# Patient Record
Sex: FEMALE | Race: WHITE | NOT HISPANIC OR LATINO | Employment: FULL TIME | URBAN - METROPOLITAN AREA
[De-identification: names, ages, dates, MRNs, and addresses within clinical notes are randomized per-mention and may not be internally consistent; named-entity substitution may affect disease eponyms.]

---

## 2017-11-27 ENCOUNTER — TELEPHONE (OUTPATIENT)
Dept: FAMILY MEDICINE CLINIC | Facility: CLINIC | Age: 52
End: 2017-11-27

## 2017-11-27 NOTE — TELEPHONE ENCOUNTER
PATIENT CALLED C/O CHEST PRESSURE/PAIN OFF AND ON, SOME NAUSEA, AND SHE FELT LIKE SHE WAS GOING TO PASS OUT THE OTHER DAY. I ADVISED PATIENT TO GO TO THE ER.

## 2017-11-28 ENCOUNTER — HOSPITAL ENCOUNTER (EMERGENCY)
Facility: HOSPITAL | Age: 52
Discharge: LEFT BEFORE DISCHARGE | End: 2017-11-28

## 2017-11-28 ENCOUNTER — APPOINTMENT (OUTPATIENT)
Dept: GENERAL RADIOLOGY | Facility: HOSPITAL | Age: 52
End: 2017-11-28

## 2017-11-28 VITALS
SYSTOLIC BLOOD PRESSURE: 120 MMHG | OXYGEN SATURATION: 99 % | WEIGHT: 140 LBS | DIASTOLIC BLOOD PRESSURE: 83 MMHG | HEIGHT: 68 IN | RESPIRATION RATE: 16 BRPM | TEMPERATURE: 98.2 F | HEART RATE: 73 BPM | BODY MASS INDEX: 21.22 KG/M2

## 2017-11-28 LAB
ALBUMIN SERPL-MCNC: 4.5 G/DL (ref 3.5–5.2)
ALBUMIN/GLOB SERPL: 1.8 G/DL
ALP SERPL-CCNC: 45 U/L (ref 39–117)
ALT SERPL W P-5'-P-CCNC: 12 U/L (ref 1–33)
ANION GAP SERPL CALCULATED.3IONS-SCNC: 6.2 MMOL/L
AST SERPL-CCNC: 24 U/L (ref 1–32)
BASOPHILS # BLD AUTO: 0.02 10*3/MM3 (ref 0–0.2)
BASOPHILS NFR BLD AUTO: 0.3 % (ref 0–1.5)
BILIRUB SERPL-MCNC: 0.8 MG/DL (ref 0.1–1.2)
BUN BLD-MCNC: 11 MG/DL (ref 6–20)
BUN/CREAT SERPL: 14.3 (ref 7–25)
CALCIUM SPEC-SCNC: 9.5 MG/DL (ref 8.6–10.5)
CHLORIDE SERPL-SCNC: 102 MMOL/L (ref 98–107)
CO2 SERPL-SCNC: 30.8 MMOL/L (ref 22–29)
CREAT BLD-MCNC: 0.77 MG/DL (ref 0.57–1)
DEPRECATED RDW RBC AUTO: 47.4 FL (ref 37–54)
EOSINOPHIL # BLD AUTO: 0.17 10*3/MM3 (ref 0–0.7)
EOSINOPHIL NFR BLD AUTO: 2.7 % (ref 0.3–6.2)
ERYTHROCYTE [DISTWIDTH] IN BLOOD BY AUTOMATED COUNT: 16.5 % (ref 11.7–13)
GFR SERPL CREATININE-BSD FRML MDRD: 79 ML/MIN/1.73
GLOBULIN UR ELPH-MCNC: 2.5 GM/DL
GLUCOSE BLD-MCNC: 102 MG/DL (ref 65–99)
HCG SERPL QL: NEGATIVE
HCT VFR BLD AUTO: 33.1 % (ref 35.6–45.5)
HGB BLD-MCNC: 9.8 G/DL (ref 11.9–15.5)
HOLD SPECIMEN: NORMAL
IMM GRANULOCYTES # BLD: 0 10*3/MM3 (ref 0–0.03)
IMM GRANULOCYTES NFR BLD: 0 % (ref 0–0.5)
LYMPHOCYTES # BLD AUTO: 1.65 10*3/MM3 (ref 0.9–4.8)
LYMPHOCYTES NFR BLD AUTO: 26.4 % (ref 19.6–45.3)
MCH RBC QN AUTO: 23.4 PG (ref 26.9–32)
MCHC RBC AUTO-ENTMCNC: 29.6 G/DL (ref 32.4–36.3)
MCV RBC AUTO: 79 FL (ref 80.5–98.2)
MONOCYTES # BLD AUTO: 0.43 10*3/MM3 (ref 0.2–1.2)
MONOCYTES NFR BLD AUTO: 6.9 % (ref 5–12)
NEUTROPHILS # BLD AUTO: 3.98 10*3/MM3 (ref 1.9–8.1)
NEUTROPHILS NFR BLD AUTO: 63.7 % (ref 42.7–76)
PLATELET # BLD AUTO: 323 10*3/MM3 (ref 140–500)
PMV BLD AUTO: 8.8 FL (ref 6–12)
POTASSIUM BLD-SCNC: 3.9 MMOL/L (ref 3.5–5.2)
PROT SERPL-MCNC: 7 G/DL (ref 6–8.5)
RBC # BLD AUTO: 4.19 10*6/MM3 (ref 3.9–5.2)
SODIUM BLD-SCNC: 139 MMOL/L (ref 136–145)
TROPONIN T SERPL-MCNC: <0.01 NG/ML (ref 0–0.03)
WBC NRBC COR # BLD: 6.25 10*3/MM3 (ref 4.5–10.7)
WHOLE BLOOD HOLD SPECIMEN: NORMAL
WHOLE BLOOD HOLD SPECIMEN: NORMAL

## 2017-11-28 PROCEDURE — 80053 COMPREHEN METABOLIC PANEL: CPT

## 2017-11-28 PROCEDURE — 99211 OFF/OP EST MAY X REQ PHY/QHP: CPT

## 2017-11-28 PROCEDURE — 85025 COMPLETE CBC W/AUTO DIFF WBC: CPT

## 2017-11-28 PROCEDURE — 84484 ASSAY OF TROPONIN QUANT: CPT

## 2017-11-28 PROCEDURE — 93005 ELECTROCARDIOGRAM TRACING: CPT

## 2017-11-28 PROCEDURE — 84703 CHORIONIC GONADOTROPIN ASSAY: CPT

## 2017-11-28 PROCEDURE — 93010 ELECTROCARDIOGRAM REPORT: CPT | Performed by: INTERNAL MEDICINE

## 2017-11-28 PROCEDURE — 36415 COLL VENOUS BLD VENIPUNCTURE: CPT

## 2017-11-28 PROCEDURE — 71020 HC CHEST PA AND LATERAL: CPT

## 2017-11-28 RX ORDER — SODIUM CHLORIDE 0.9 % (FLUSH) 0.9 %
10 SYRINGE (ML) INJECTION AS NEEDED
Status: DISCONTINUED | OUTPATIENT
Start: 2017-11-28 | End: 2017-11-28 | Stop reason: HOSPADM

## 2017-11-28 RX ORDER — ASPIRIN 325 MG
325 TABLET ORAL ONCE
Status: DISCONTINUED | OUTPATIENT
Start: 2017-11-28 | End: 2017-11-28 | Stop reason: HOSPADM

## 2018-05-18 ENCOUNTER — APPOINTMENT (OUTPATIENT)
Dept: CT IMAGING | Facility: HOSPITAL | Age: 53
End: 2018-05-18

## 2018-05-18 ENCOUNTER — APPOINTMENT (OUTPATIENT)
Dept: GENERAL RADIOLOGY | Facility: HOSPITAL | Age: 53
End: 2018-05-18

## 2018-05-18 ENCOUNTER — HOSPITAL ENCOUNTER (EMERGENCY)
Facility: HOSPITAL | Age: 53
Discharge: HOME | End: 2018-05-18
Attending: EMERGENCY MEDICINE | Admitting: EMERGENCY MEDICINE

## 2018-05-18 VITALS
HEART RATE: 58 BPM | OXYGEN SATURATION: 99 % | DIASTOLIC BLOOD PRESSURE: 70 MMHG | HEIGHT: 68 IN | SYSTOLIC BLOOD PRESSURE: 101 MMHG | BODY MASS INDEX: 21.07 KG/M2 | RESPIRATION RATE: 20 BRPM | TEMPERATURE: 98.1 F | WEIGHT: 139 LBS

## 2018-05-18 DIAGNOSIS — M62.838 NECK MUSCLE SPASM: ICD-10-CM

## 2018-05-18 DIAGNOSIS — G44.209 TENSION HEADACHE: Primary | ICD-10-CM

## 2018-05-18 LAB
ALBUMIN SERPL-MCNC: 4.6 G/DL (ref 3.5–5.2)
ALBUMIN/GLOB SERPL: 1.8 G/DL
ALP SERPL-CCNC: 46 U/L (ref 39–117)
ALT SERPL W P-5'-P-CCNC: 9 U/L (ref 1–33)
ANION GAP SERPL CALCULATED.3IONS-SCNC: 9.1 MMOL/L
AST SERPL-CCNC: 19 U/L (ref 1–32)
BASOPHILS # BLD AUTO: 0.01 10*3/MM3 (ref 0–0.2)
BASOPHILS NFR BLD AUTO: 0.2 % (ref 0–1.5)
BILIRUB SERPL-MCNC: 1 MG/DL (ref 0.1–1.2)
BUN BLD-MCNC: 13 MG/DL (ref 6–20)
BUN/CREAT SERPL: 18.6 (ref 7–25)
CALCIUM SPEC-SCNC: 9.5 MG/DL (ref 8.6–10.5)
CHLORIDE SERPL-SCNC: 104 MMOL/L (ref 98–107)
CO2 SERPL-SCNC: 28.9 MMOL/L (ref 22–29)
CREAT BLD-MCNC: 0.7 MG/DL (ref 0.57–1)
DEPRECATED RDW RBC AUTO: 54 FL (ref 37–54)
EOSINOPHIL # BLD AUTO: 0.18 10*3/MM3 (ref 0–0.7)
EOSINOPHIL NFR BLD AUTO: 4.1 % (ref 0.3–6.2)
ERYTHROCYTE [DISTWIDTH] IN BLOOD BY AUTOMATED COUNT: 17.5 % (ref 11.7–13)
GFR SERPL CREATININE-BSD FRML MDRD: 88 ML/MIN/1.73
GLOBULIN UR ELPH-MCNC: 2.5 GM/DL
GLUCOSE BLD-MCNC: 84 MG/DL (ref 65–99)
HCG SERPL QL: NORMAL
HCT VFR BLD AUTO: 35.2 % (ref 35.6–45.5)
HGB BLD-MCNC: 11.2 G/DL (ref 11.9–15.5)
HOLD SPECIMEN: NORMAL
IMM GRANULOCYTES # BLD: 0.01 10*3/MM3 (ref 0–0.03)
IMM GRANULOCYTES NFR BLD: 0.2 % (ref 0–0.5)
LYMPHOCYTES # BLD AUTO: 1.71 10*3/MM3 (ref 0.9–4.8)
LYMPHOCYTES NFR BLD AUTO: 38.6 % (ref 19.6–45.3)
MAGNESIUM SERPL-MCNC: 2.2 MG/DL (ref 1.6–2.6)
MCH RBC QN AUTO: 26.7 PG (ref 26.9–32)
MCHC RBC AUTO-ENTMCNC: 31.8 G/DL (ref 32.4–36.3)
MCV RBC AUTO: 83.8 FL (ref 80.5–98.2)
MONOCYTES # BLD AUTO: 0.38 10*3/MM3 (ref 0.2–1.2)
MONOCYTES NFR BLD AUTO: 8.6 % (ref 5–12)
NEUTROPHILS # BLD AUTO: 2.15 10*3/MM3 (ref 1.9–8.1)
NEUTROPHILS NFR BLD AUTO: 48.5 % (ref 42.7–76)
PLATELET # BLD AUTO: 294 10*3/MM3 (ref 140–500)
PMV BLD AUTO: 8.9 FL (ref 6–12)
POTASSIUM BLD-SCNC: 4 MMOL/L (ref 3.5–5.2)
PROT SERPL-MCNC: 7.1 G/DL (ref 6–8.5)
RBC # BLD AUTO: 4.2 10*6/MM3 (ref 3.9–5.2)
SODIUM BLD-SCNC: 142 MMOL/L (ref 136–145)
TROPONIN T SERPL-MCNC: <0.01 NG/ML (ref 0–0.03)
WBC NRBC COR # BLD: 4.43 10*3/MM3 (ref 4.5–10.7)
WHOLE BLOOD HOLD SPECIMEN: NORMAL
WHOLE BLOOD HOLD SPECIMEN: NORMAL

## 2018-05-18 PROCEDURE — 84703 CHORIONIC GONADOTROPIN ASSAY: CPT

## 2018-05-18 PROCEDURE — 72050 X-RAY EXAM NECK SPINE 4/5VWS: CPT

## 2018-05-18 PROCEDURE — 85025 COMPLETE CBC W/AUTO DIFF WBC: CPT

## 2018-05-18 PROCEDURE — 80053 COMPREHEN METABOLIC PANEL: CPT

## 2018-05-18 PROCEDURE — 99284 EMERGENCY DEPT VISIT MOD MDM: CPT

## 2018-05-18 PROCEDURE — 83735 ASSAY OF MAGNESIUM: CPT

## 2018-05-18 PROCEDURE — 70450 CT HEAD/BRAIN W/O DYE: CPT

## 2018-05-18 PROCEDURE — 84484 ASSAY OF TROPONIN QUANT: CPT

## 2018-05-18 RX ORDER — DIAZEPAM 5 MG/1
5 TABLET ORAL ONCE
Status: COMPLETED | OUTPATIENT
Start: 2018-05-18 | End: 2018-05-18

## 2018-05-18 RX ORDER — HYDROCODONE BITARTRATE AND ACETAMINOPHEN 7.5; 325 MG/1; MG/1
1 TABLET ORAL ONCE
Status: COMPLETED | OUTPATIENT
Start: 2018-05-18 | End: 2018-05-18

## 2018-05-18 RX ORDER — SODIUM CHLORIDE 0.9 % (FLUSH) 0.9 %
10 SYRINGE (ML) INJECTION AS NEEDED
Status: DISCONTINUED | OUTPATIENT
Start: 2018-05-18 | End: 2018-05-18 | Stop reason: HOSPADM

## 2018-05-18 RX ORDER — BACLOFEN 10 MG/1
10 TABLET ORAL 3 TIMES DAILY PRN
Qty: 15 TABLET | Refills: 0 | Status: SHIPPED | OUTPATIENT
Start: 2018-05-18 | End: 2019-05-21

## 2018-05-18 RX ADMIN — DIAZEPAM 5 MG: 5 TABLET ORAL at 17:31

## 2018-05-18 RX ADMIN — HYDROCODONE BITARTRATE AND ACETAMINOPHEN 1 TABLET: 7.5; 325 TABLET ORAL at 17:30

## 2018-05-18 NOTE — ED PROVIDER NOTES
The patient presents complaining of HA and neck pain that began 2 days ago. The pt states the pain started between her shoulder blades and is now in her neck and head. The pt states the pain is worse with movement of the neck. Pt reports a hx of tension HA and increased stress in her life.    Physical Exam:  Patient is nontoxic appearing and in NAD. The pt is alert and oriented X 3. The pt has muscle spasm to the cervical and trapezius muscles. The pain is worse with movement of the head to the right and left. The pt has no meningeal signs.    Discussed the negative lab and CT results with the pt. Discussed the possible causes of the pt's pain including the pt having muscle spasm and tension HA. Discussed the plan to discharge the pt with Flexeril and Ibuprofen. I advised the pt to take the Flexeril at night to help with the pain and use Ibuprofen during the day while at work to prevent drowsiness at work.     MD ATTESTATION NOTE    The STEFF and I have discussed this patient's history, physical exam, and treatment plan.  I have reviewed the documentation and personally had a face to face interaction with the patient. I affirm the documentation and agree with the treatment and plan.  The attached note describes my personal findings.    Documentation assistance provided by tayler Covington for Dr. Ledesma. Information recorded by the abilioe was done at my direction and has been verified and validated by me.                 Bry Covington  05/18/18 6069       Alden Ledesma MD  05/18/18 4670

## 2018-05-18 NOTE — DISCHARGE INSTRUCTIONS
Medications as ordered  Ibuprofen as needed for headache  Warm compresses to neck with gentle stretching  Follow up with pmd in 5-7 days if symptoms not improving  Return to er for numbness/tingling to hands, worsening headache, increased pain or any new or worsening symptoms

## 2018-05-18 NOTE — ED PROVIDER NOTES
"  EMERGENCY DEPARTMENT ENCOUNTER    CHIEF COMPLAINT  Chief Complaint: neck pain  History given by: patient  History limited by: N/A  Room Number: 39/39  PMD: Andrew Lazo MD      HPI:  Pt is a 53 y.o. female who presents with neck pain. She states that about 2 days ago, she had a migraine (characteristically similar to her previous migraines) that resolved on its own. Yesterday, she had an episode of dizziness (described as \"room spinning\") that also resolved on its own. This morning, while she was getting out of the shower, she had onset of \"cool and burning\" pain from her shoulders to her neck and up to the vertex of her head (pain worst to the base of her neck). Pt states that today's pain is constant, rated at 8/10 in intensity, and is not the worst headache of her life. It is exacerbated by head/neck movement. She denies recent injury or trauma to head or neck, dizziness today, focal weakness, numbness, trouble with speech, trouble swallowing, vision changes, nausea, vomiting, neck stiffness, chest pain, dyspnea, abd pain, fevers, and chills. Past Medical History of migraines.     Duration: started this morning  Timing: constant  Location: from shoulders to neck and up to vertex of head (pain worst to the base of neck)  Radiation: from shoulders to neck and up to vertex of head  Quality: \"cool and burning\"  Intensity/Severity: rated at 8/10  Progression: unchanged  Associated Symptoms: headache (characteristically similar to previous migraines, occurred about 2 days ago, resolved), dizziness (occurred yesterday, resolved)  Aggravating Factors: head/neck movement  Alleviating Factors: keeping head/neck still  Previous Episodes: none  Treatment before arrival: none mentioned    PAST MEDICAL HISTORY  Active Ambulatory Problems     Diagnosis Date Noted   • No Active Ambulatory Problems     Resolved Ambulatory Problems     Diagnosis Date Noted   • No Resolved Ambulatory Problems     Past Medical History: " "  Diagnosis Date   • Migraine        PAST SURGICAL HISTORY  Past Surgical History:   Procedure Laterality Date   •  SECTION         FAMILY HISTORY  Family History   Problem Relation Age of Onset   • No Known Problems Mother    • No Known Problems Father        SOCIAL HISTORY  Social History     Social History   • Marital status:      Spouse name: N/A   • Number of children: N/A   • Years of education: N/A     Occupational History   • Not on file.     Social History Main Topics   • Smoking status: Never Smoker   • Smokeless tobacco: Not on file   • Alcohol use Yes      Comment: OCCASIONAL   • Drug use: No   • Sexual activity: Not on file     Other Topics Concern   • Not on file     Social History Narrative   • No narrative on file         ALLERGIES  Patient has no known allergies.    REVIEW OF SYSTEMS  Review of Systems   Constitutional: Negative for chills and fever.   HENT: Negative for sore throat and trouble swallowing.    Eyes: Negative for visual disturbance.   Respiratory: Negative for shortness of breath.    Cardiovascular: Negative for chest pain.   Gastrointestinal: Negative for abdominal pain, nausea and vomiting.   Genitourinary: Negative for difficulty urinating and dysuria.   Musculoskeletal: Positive for neck pain (pain from shoulders to neck and up to vertex of head (pain worst to the base of neck)). Negative for back pain and neck stiffness.   Skin: Negative for rash.   Neurological: Positive for dizziness (described as \"room spinning\" (occurred yesterday, resolved)) and headaches (pain from shoulders to neck and up to vertex of head (occurred today, pain worst to base of neck); headache (characteristically similar to previous migraines, occurred about 2 days ago, resolved)). Negative for speech difficulty, weakness and numbness.   Psychiatric/Behavioral: The patient is not nervous/anxious.        PHYSICAL EXAM  ED Triage Vitals   Temp Heart Rate Resp BP SpO2   18 1302 18 " 1302 05/18/18 1302 05/18/18 1409 05/18/18 1302   97.7 °F (36.5 °C) 80 16 125/68 99 % WNL       Physical Exam   Constitutional: She is oriented to person, place, and time and well-developed, well-nourished, and in no distress.   HENT:   Head: Normocephalic and atraumatic.   Mouth/Throat: Mucous membranes are normal.   Eyes: EOM are normal. Pupils are equal, round, and reactive to light. No scleral icterus.   Neck: Neck supple.   Bilateral paraspinal cervical tenderness, no midline c-spine tenderness, painful ROM of neck, no nuchal rigidity, no meningeal signs   Cardiovascular: Normal rate, regular rhythm and normal heart sounds.    Pulses:       Radial pulses are 2+ on the right side, and 2+ on the left side.   Pulmonary/Chest: Effort normal and breath sounds normal. No respiratory distress.   Musculoskeletal:   No t-spine tenderness, NV intact distally to BUE   Neurological: She is alert and oriented to person, place, and time. She has normal motor skills and normal sensation. GCS score is 15.   Equal  strength bilaterally, nonfocal neuro exam   Skin: Skin is warm and dry.   Psychiatric: Mood and affect normal.   Nursing note and vitals reviewed.      LAB RESULTS  Recent Results (from the past 24 hour(s))   Comprehensive Metabolic Panel    Collection Time: 05/18/18  2:32 PM   Result Value Ref Range    Glucose 84 65 - 99 mg/dL    BUN 13 6 - 20 mg/dL    Creatinine 0.70 0.57 - 1.00 mg/dL    Sodium 142 136 - 145 mmol/L    Potassium 4.0 3.5 - 5.2 mmol/L    Chloride 104 98 - 107 mmol/L    CO2 28.9 22.0 - 29.0 mmol/L    Calcium 9.5 8.6 - 10.5 mg/dL    Total Protein 7.1 6.0 - 8.5 g/dL    Albumin 4.60 3.50 - 5.20 g/dL    ALT (SGPT) 9 1 - 33 U/L    AST (SGOT) 19 1 - 32 U/L    Alkaline Phosphatase 46 39 - 117 U/L    Total Bilirubin 1.0 0.1 - 1.2 mg/dL    eGFR Non African Amer 88 >60 mL/min/1.73    Globulin 2.5 gm/dL    A/G Ratio 1.8 g/dL    BUN/Creatinine Ratio 18.6 7.0 - 25.0    Anion Gap 9.1 mmol/L   Magnesium     Collection Time: 05/18/18  2:32 PM   Result Value Ref Range    Magnesium 2.2 1.6 - 2.6 mg/dL   Troponin    Collection Time: 05/18/18  2:32 PM   Result Value Ref Range    Troponin T <0.010 0.000 - 0.030 ng/mL   hCG, Serum, Qualitative    Collection Time: 05/18/18  2:32 PM   Result Value Ref Range    HCG Qualitative Indeterminate Indeterminate, Negative   Light Blue Top    Collection Time: 05/18/18  2:32 PM   Result Value Ref Range    Extra Tube hold for add-on    Green Top (Gel)    Collection Time: 05/18/18  2:32 PM   Result Value Ref Range    Extra Tube Hold for add-ons.    Lavender Top    Collection Time: 05/18/18  2:32 PM   Result Value Ref Range    Extra Tube hold for add-on    CBC Auto Differential    Collection Time: 05/18/18  2:32 PM   Result Value Ref Range    WBC 4.43 (L) 4.50 - 10.70 10*3/mm3    RBC 4.20 3.90 - 5.20 10*6/mm3    Hemoglobin 11.2 (L) 11.9 - 15.5 g/dL    Hematocrit 35.2 (L) 35.6 - 45.5 %    MCV 83.8 80.5 - 98.2 fL    MCH 26.7 (L) 26.9 - 32.0 pg    MCHC 31.8 (L) 32.4 - 36.3 g/dL    RDW 17.5 (H) 11.7 - 13.0 %    RDW-SD 54.0 37.0 - 54.0 fl    MPV 8.9 6.0 - 12.0 fL    Platelets 294 140 - 500 10*3/mm3    Neutrophil % 48.5 42.7 - 76.0 %    Lymphocyte % 38.6 19.6 - 45.3 %    Monocyte % 8.6 5.0 - 12.0 %    Eosinophil % 4.1 0.3 - 6.2 %    Basophil % 0.2 0.0 - 1.5 %    Immature Grans % 0.2 0.0 - 0.5 %    Neutrophils, Absolute 2.15 1.90 - 8.10 10*3/mm3    Lymphocytes, Absolute 1.71 0.90 - 4.80 10*3/mm3    Monocytes, Absolute 0.38 0.20 - 1.20 10*3/mm3    Eosinophils, Absolute 0.18 0.00 - 0.70 10*3/mm3    Basophils, Absolute 0.01 0.00 - 0.20 10*3/mm3    Immature Grans, Absolute 0.01 0.00 - 0.03 10*3/mm3       I ordered the above labs and reviewed the results      RADIOLOGY  XR Spine Cervical Complete 4 or 5 View (Preliminary result)   Result time 05/18/18 17:31:08   Preliminary result by Iván Riggins MD (05/18/18 17:31:08)                Impression:    Degenerative disc and facet change in the cervical  spine  without any subluxation or appreciable osseous foraminal stenosis. There  is no acute appearing abnormality.               Narrative:    CERVICAL SPINE X-RAYS     HISTORY: Neck pain started this morning and radiates to the shoulders.     TECHNIQUE: 5 x-rays of the cervical spine are provided.     FINDINGS: There is moderate disc space narrowing at C5-6 with mild disc  space narrowing at C6-7 and C7-T1, facet arthropathy is observed at the  mid-to-lower cervical spine, most advanced at C7-T1. Vertebral body  height and alignment are normal. There is no osseous foraminal stenosis.  No bone lesion is present. Visualized lung apices are clear.                       CT Head Without Contrast (Final result)   Result time 05/18/18 17:29:43   Final result by Simón Hernández MD (05/18/18 17:29:43)                Narrative:    CT SCAN OF THE BRAIN WITHOUT CONTRAST     HISTORY: Severe headaches. Dizziness.     TECHNIQUE: The CT scan was performed as an emergency procedure through  the brain without contrast.      FINDINGS: The ventricles are normal in size and midline. There is no  evidence of intracranial hemorrhage or focal lesion or mass effect and  the visualized sinuses are clear.             Radiation dose reduction techniques were utilized, including automated  exposure control and exposure modulation based on body size.     This report was finalized on 5/18/2018 5:29 PM by Dr. Doni Hernández M.D.                       I ordered the above noted radiological studies and reviewed the images on the PACS system.         PROGRESS AND CONSULTS  1626- Ordered CT head and c-spine xray for further evaluation.  1655- Obtained CT head results-> no acute process.  1700- Reviewed pt's history and workup with Dr. Ledesma.  At bedside evaluation, they agree with the plan of care.  1706- Ordered norco and valium for pain.   1750- Rechecked pt. She is resting comfortably and is in no acute distress. Her sx have improved after  ED treatment. Reviewed implications of results (including stable labs, CT head findings (no acute process), c-spine xray findings (degenerative changes of c-spine, no acute fracture)), diagnosis, meds, responsibility to follow up, warning signs and symptoms of possible worsening, potential complications and reasons to return to ER with patient.  Discussed all results and noted any abnormalities with patient.  Discussed absolute need to recheck abnormalities and condition with PMD. Informed pt that her sx could possibly be due to tension headache and cervical muscle spasm. Informed pt of plan to prescribe muscle relaxer. Advised pt to take ibuprofen for headache and to apply warm compresses to neck with gentle stretching.   Discussed plan for discharge, as there is no emergent indication for admission.  Pt is agreeable and understands need for follow up and repeat testing.  Pt is aware that discharge does not mean that nothing is wrong but it indicates no emergency is present.  Pt is discharged with instructions to follow up with primary care doctor to have their blood pressure rechecked.       DIAGNOSIS  Final diagnoses:   Tension headache   Neck muscle spasm       FOLLOW UP   Andrew Lazo MD  [id number] [place] [address]  [phone]    Schedule an appointment as soon as possible for a visit   As needed      RX     Medication List     baclofen 10 MG tablet  Commonly known as:  LIORESAL  Take 1 tablet by mouth 3 (Three) Times a Day As Needed for Muscle Spasms.        COURSE & MEDICAL DECISION MAKING  Pertinent Labs and Imaging studies that were ordered and reviewed are noted above.  Results were reviewed/discussed with the patient and they were also made aware of online assess.   Pt also made aware that some labs, such as cultures, will not be resulted during ER visit and follow up with PMD is necessary.     MEDICATIONS GIVEN IN ER  Medications   sodium chloride 0.9 % flush 10 mL (not administered)  "  HYDROcodone-acetaminophen (NORCO) 7.5-325 MG per tablet 1 tablet (1 tablet Oral Given 5/18/18 1730)   diazePAM (VALIUM) tablet 5 mg (5 mg Oral Given 5/18/18 1731)       /70   Pulse 58   Temp 98.1 °F (36.7 °C) (Oral)   Resp 20   Ht 172.7 cm (68\")   Wt 63 kg (139 lb)   LMP 05/16/2018   SpO2 99%   BMI 21.13 kg/m²       I personally reviewed the past medical history, past surgical history, social history, family history, current medications and allergies as they appear in this chart.  The scribe's note accurately reflects the work and decisions made by me.     Documentation assistance provided by tayler Latham for JOCELYN Alatorre on 5/18/2018 at 6:40 PM. Information recorded by the scribe was done at my direction and has been verified and validated by me.       Korey Latham  05/18/18 1923       JOSE LUIS Hearn  05/18/18 2015    "

## 2018-05-21 ENCOUNTER — TELEPHONE (OUTPATIENT)
Dept: SOCIAL WORK | Facility: HOSPITAL | Age: 53
End: 2018-05-21

## 2019-05-21 ENCOUNTER — OFFICE VISIT (OUTPATIENT)
Dept: FAMILY MEDICINE CLINIC | Facility: CLINIC | Age: 54
End: 2019-05-21

## 2019-05-21 VITALS
BODY MASS INDEX: 22.91 KG/M2 | DIASTOLIC BLOOD PRESSURE: 72 MMHG | WEIGHT: 151.2 LBS | HEIGHT: 68 IN | SYSTOLIC BLOOD PRESSURE: 116 MMHG | OXYGEN SATURATION: 98 % | HEART RATE: 65 BPM | TEMPERATURE: 98.1 F

## 2019-05-21 DIAGNOSIS — N30.00 ACUTE CYSTITIS WITHOUT HEMATURIA: Primary | ICD-10-CM

## 2019-05-21 LAB
BILIRUB BLD-MCNC: NEGATIVE MG/DL
CLARITY, POC: CLEAR
COLOR UR: YELLOW
GLUCOSE UR STRIP-MCNC: NEGATIVE MG/DL
KETONES UR QL: NEGATIVE
LEUKOCYTE EST, POC: NEGATIVE
NITRITE UR-MCNC: NEGATIVE MG/ML
PH UR: 5.5 [PH] (ref 5–8)
PROT UR STRIP-MCNC: NEGATIVE MG/DL
RBC # UR STRIP: ABNORMAL /UL
SP GR UR: 1.02 (ref 1–1.03)
UROBILINOGEN UR QL: NORMAL

## 2019-05-21 PROCEDURE — 99213 OFFICE O/P EST LOW 20 MIN: CPT | Performed by: NURSE PRACTITIONER

## 2019-05-21 PROCEDURE — 81003 URINALYSIS AUTO W/O SCOPE: CPT | Performed by: NURSE PRACTITIONER

## 2019-05-21 RX ORDER — NITROFURANTOIN 25; 75 MG/1; MG/1
100 CAPSULE ORAL 2 TIMES DAILY
Qty: 14 CAPSULE | Refills: 0 | Status: SHIPPED | OUTPATIENT
Start: 2019-05-21 | End: 2020-07-24

## 2019-05-23 LAB
BACTERIA UR CULT: NORMAL
BACTERIA UR CULT: NORMAL

## 2021-04-05 ENCOUNTER — IMMUNIZATION (OUTPATIENT)
Dept: VACCINE CLINIC | Facility: HOSPITAL | Age: 56
End: 2021-04-05

## 2021-04-05 PROCEDURE — 0001A: CPT | Performed by: INTERNAL MEDICINE

## 2021-04-05 PROCEDURE — 91300 HC SARSCOV02 VAC 30MCG/0.3ML IM: CPT | Performed by: INTERNAL MEDICINE

## 2021-04-26 ENCOUNTER — IMMUNIZATION (OUTPATIENT)
Dept: VACCINE CLINIC | Facility: HOSPITAL | Age: 56
End: 2021-04-26

## 2021-04-26 PROCEDURE — 91300 HC SARSCOV02 VAC 30MCG/0.3ML IM: CPT | Performed by: INTERNAL MEDICINE

## 2021-04-26 PROCEDURE — 0002A: CPT | Performed by: INTERNAL MEDICINE

## 2023-07-17 NOTE — PROGRESS NOTES
Patient inpatient.   Subjective   Misti Mcguire is a 54 y.o. female.   Chief Complaint   Patient presents with   • Urinary Frequency   • Difficulty Urinating     Vitals:    05/21/19 1113   BP: 116/72   Pulse: 65   Temp: 98.1 °F (36.7 °C)   SpO2: 98%     Patient's last menstrual period was 05/20/2019.    Misti is a patient of Dr Lazo who is here for an acute visit.       Urinary Frequency    This is a new problem. Episode onset: 9 days  The problem occurs every urination. The problem has been unchanged. The quality of the pain is described as aching and burning (pressure ). The pain is moderate. There has been no fever. There is no history of pyelonephritis. Associated symptoms include a discharge (has not had a period in several months and started with vaginal bleeding ), frequency, nausea and urgency. Pertinent negatives include no chills, flank pain, hematuria or vomiting. Treatments tried: azo  The treatment provided mild relief.   Difficulty Urinating   Associated symptoms include fatigue and nausea. Pertinent negatives include no chills, fever or vomiting.        The following portions of the patient's history were reviewed and updated as appropriate: allergies, current medications, past family history, past medical history, past social history, past surgical history and problem list.    Review of Systems   Constitutional: Positive for fatigue. Negative for chills and fever.   HENT: Negative.    Respiratory: Negative.    Cardiovascular: Negative.    Gastrointestinal: Positive for nausea. Negative for vomiting.   Genitourinary: Positive for difficulty urinating, dysuria, frequency, menstrual problem and urgency. Negative for flank pain and hematuria. Pelvic pain: suprapubic pain with urination        Objective   Physical Exam   Constitutional: Vital signs are normal. She appears well-developed and well-nourished. No distress.   Cardiovascular: Normal rate and regular rhythm.   Pulmonary/Chest: Effort normal and breath sounds normal.    Abdominal: There is no tenderness. There is no CVA tenderness.   Neurological: She is alert.   Skin: Skin is warm, dry and intact.     Brief Urine Lab Results  (Last result in the past 365 days)      Color   Clarity   Blood   Leuk Est   Nitrite   Protein   CREAT   Urine HCG        05/21/19 1136 Yellow Clear Trace Negative Negative Negative                 Assessment/Plan   Misti was seen today for urinary frequency and difficulty urinating.    Diagnoses and all orders for this visit:    Acute cystitis without hematuria  -     Urine Culture - Urine, Urine, Clean Catch  -     POC Urinalysis Dipstick, Automated    Other orders  -     nitrofurantoin, macrocrystal-monohydrate, (MACROBID) 100 MG capsule; Take 1 capsule by mouth 2 (Two) Times a Day.      UA showed a trace of blood  Will start macrobid and send a culture  Recommend that she schedule a yearly exam with her gyn  Will call with culture results  Rest and drink plenty of fluids   Avoid caffeine  Empty your bladder frequently  Follow up if your symptoms persist past 7 days or sooner if your symptoms worsen or if you develop new symptoms  Advised to go to the ER for high fever, severe abdominal pain/low back pain, weakness, or lethargy

## 2024-06-28 ENCOUNTER — OFFICE VISIT (OUTPATIENT)
Dept: FAMILY MEDICINE CLINIC | Facility: CLINIC | Age: 59
End: 2024-06-28
Payer: COMMERCIAL

## 2024-06-28 VITALS
WEIGHT: 145.5 LBS | HEART RATE: 55 BPM | DIASTOLIC BLOOD PRESSURE: 60 MMHG | SYSTOLIC BLOOD PRESSURE: 110 MMHG | TEMPERATURE: 96.4 F | OXYGEN SATURATION: 96 % | HEIGHT: 68 IN | BODY MASS INDEX: 22.05 KG/M2 | RESPIRATION RATE: 14 BRPM

## 2024-06-28 DIAGNOSIS — M25.649 JOINT STIFFNESS OF HAND, UNSPECIFIED LATERALITY: ICD-10-CM

## 2024-06-28 DIAGNOSIS — R53.83 OTHER FATIGUE: ICD-10-CM

## 2024-06-28 DIAGNOSIS — Z00.00 ENCOUNTER FOR MEDICAL EXAMINATION TO ESTABLISH CARE: Primary | ICD-10-CM

## 2024-06-28 DIAGNOSIS — R42 DIZZINESS: ICD-10-CM

## 2024-06-28 PROCEDURE — 99204 OFFICE O/P NEW MOD 45 MIN: CPT | Performed by: STUDENT IN AN ORGANIZED HEALTH CARE EDUCATION/TRAINING PROGRAM

## 2024-06-28 NOTE — PROGRESS NOTES
"Chief Complaint  Establish Care (Discuss multiple health issues ), Dizziness, and Heartburn (SEVERE GERD FEELS LIKE SOMETHING IN THROAT AFTER EATING BURPS A LOT )    Subjective        Misti Mcguire presents to [facility]  History of Present Illness  Establishing medical care  Multiple health complaints as follows, has not seen her PCP in last couple of years  Fatigue  Upper back pain from 1 shoulder to other shoulder  Dizziness on and off, not related to any physical activitiy.  Hard time remember words  Stressful sometimes, used to be more stressful couple of years ago when she was working in [employer] business  Indigestion issues for couple of weeks  Taking supplements everyday for bowel movements  Left wrist pain after striking her hand on ground   Pt stated she has history of rheumatoid arthritis and often notice small joints in hands including finger joints stiff.    Objective   Vital Signs:  /60   Pulse 55   Temp 96.4 °F (35.8 °C) (Temporal)   Resp 14   Ht 172.7 cm (68\")   Wt 66 kg (145 lb 8 oz)   SpO2 96%   BMI 22.12 kg/m²   Estimated body mass index is 22.12 kg/m² as calculated from the following:    Height as of this encounter: 172.7 cm (68\").    Weight as of this encounter: 66 kg (145 lb 8 oz).       BMI is within normal parameters. No other follow-up for BMI required.      Physical Exam  Vitals reviewed: Orthostatic hypotension negative.   HENT:      Right Ear: Tympanic membrane, ear canal and external ear normal. There is no impacted cerumen.      Left Ear: Tympanic membrane, ear canal and external ear normal. There is no impacted cerumen.      Mouth/Throat:      Comments: Tonsils are enlarged but no inflammation or redness noticed  Eyes:      Extraocular Movements: Extraocular movements intact.      Conjunctiva/sclera: Conjunctivae normal.      Pupils: Pupils are equal, round, and reactive to light.   Cardiovascular:      Rate and Rhythm: Normal rate.      " Pulses: Normal pulses.      Heart sounds: Normal heart sounds.   Pulmonary:      Effort: Pulmonary effort is normal.      Breath sounds: Normal breath sounds.   Abdominal:      General: Bowel sounds are normal.      Palpations: Abdomen is soft.   Musculoskeletal:      Right lower leg: No edema.      Left lower leg: No edema.   Skin:     General: Skin is warm.   Neurological:      Mental Status: She is alert and oriented to person, place, and time.        Result Review :                     Assessment and Plan     Diagnoses and all orders for this visit:    1. Joint stiffness of hand, unspecified laterality (Primary)  -     TSH  -     Lipid Panel With / Chol / HDL Ratio  -     Comprehensive Metabolic Panel  -     Vitamin D,25-Hydroxy  -     Sedimentation rate, automated  -     C-reactive protein    2. Other fatigue  -     TSH  -     Lipid Panel With / Chol / HDL Ratio  -     Comprehensive Metabolic Panel  -     Vitamin D,25-Hydroxy  -     Sedimentation rate, automated  -     Vitamin B12  -     CBC & Differential    3. Dizziness  -     TSH  -     Lipid Panel With / Chol / HDL Ratio  -     Comprehensive Metabolic Panel  -     Vitamin D,25-Hydroxy  -     Sedimentation rate, automated  -     C-reactive protein  -     Vitamin B12  -     CBC & Differential    4. Encounter for medical examination to establish care  -     TSH  -     Lipid Panel With / Chol / HDL Ratio  -     Comprehensive Metabolic Panel  -     Vitamin D,25-Hydroxy  -     Sedimentation rate, automated  -     C-reactive protein  -     Vitamin B12  -     CBC & Differential    # Discussed with the patient about importance of labs which will include vitamin levels, making sure patient is not anemic, cholesterol checkup, liver kidney function checkup and checking inflammatory markers for joint stiffness.  Thyroid checkup for generalized fatigue  Will review the labs and will plan accordingly  # Upper back pain was not evaluated in this visit, patient is coming  back for physical visit also or if problem worsen patient will come back sooner than the next appointment.  Plan to have physical in 3 months         Follow Up     No follow-ups on file.  Patient was given instructions and counseling regarding her condition or for health maintenance advice. Please see specific information pulled into the AVS if appropriate.

## 2024-06-29 LAB
25(OH)D3+25(OH)D2 SERPL-MCNC: 26.6 NG/ML (ref 30–100)
ALBUMIN SERPL-MCNC: 4.7 G/DL (ref 3.8–4.9)
ALP SERPL-CCNC: 64 IU/L (ref 44–121)
ALT SERPL-CCNC: 18 IU/L (ref 0–32)
AST SERPL-CCNC: 25 IU/L (ref 0–40)
BASOPHILS # BLD AUTO: 0 X10E3/UL (ref 0–0.2)
BASOPHILS NFR BLD AUTO: 1 %
BILIRUB SERPL-MCNC: 1.4 MG/DL (ref 0–1.2)
BUN SERPL-MCNC: 11 MG/DL (ref 6–24)
BUN/CREAT SERPL: 14 (ref 9–23)
CALCIUM SERPL-MCNC: 10.2 MG/DL (ref 8.7–10.2)
CHLORIDE SERPL-SCNC: 103 MMOL/L (ref 96–106)
CHOLEST SERPL-MCNC: 227 MG/DL (ref 100–199)
CHOLEST/HDLC SERPL: 2.8 RATIO (ref 0–4.4)
CO2 SERPL-SCNC: 24 MMOL/L (ref 20–29)
CREAT SERPL-MCNC: 0.79 MG/DL (ref 0.57–1)
CRP SERPL-MCNC: <1 MG/L (ref 0–10)
EGFRCR SERPLBLD CKD-EPI 2021: 87 ML/MIN/1.73
EOSINOPHIL # BLD AUTO: 0.3 X10E3/UL (ref 0–0.4)
EOSINOPHIL NFR BLD AUTO: 5 %
ERYTHROCYTE [DISTWIDTH] IN BLOOD BY AUTOMATED COUNT: 12 % (ref 11.7–15.4)
ERYTHROCYTE [SEDIMENTATION RATE] IN BLOOD BY WESTERGREN METHOD: 2 MM/HR (ref 0–40)
GLOBULIN SER CALC-MCNC: 2.2 G/DL (ref 1.5–4.5)
GLUCOSE SERPL-MCNC: 82 MG/DL (ref 70–99)
HCT VFR BLD AUTO: 42.8 % (ref 34–46.6)
HDLC SERPL-MCNC: 80 MG/DL
HGB BLD-MCNC: 14.9 G/DL (ref 11.1–15.9)
IMM GRANULOCYTES # BLD AUTO: 0 X10E3/UL (ref 0–0.1)
IMM GRANULOCYTES NFR BLD AUTO: 0 %
LDLC SERPL CALC-MCNC: 138 MG/DL (ref 0–99)
LYMPHOCYTES # BLD AUTO: 1.6 X10E3/UL (ref 0.7–3.1)
LYMPHOCYTES NFR BLD AUTO: 31 %
MCH RBC QN AUTO: 31.6 PG (ref 26.6–33)
MCHC RBC AUTO-ENTMCNC: 34.8 G/DL (ref 31.5–35.7)
MCV RBC AUTO: 91 FL (ref 79–97)
MONOCYTES # BLD AUTO: 0.5 X10E3/UL (ref 0.1–0.9)
MONOCYTES NFR BLD AUTO: 9 %
NEUTROPHILS # BLD AUTO: 2.8 X10E3/UL (ref 1.4–7)
NEUTROPHILS NFR BLD AUTO: 54 %
PLATELET # BLD AUTO: 339 X10E3/UL (ref 150–450)
POTASSIUM SERPL-SCNC: 4.4 MMOL/L (ref 3.5–5.2)
PROT SERPL-MCNC: 6.9 G/DL (ref 6–8.5)
RBC # BLD AUTO: 4.71 X10E6/UL (ref 3.77–5.28)
SODIUM SERPL-SCNC: 141 MMOL/L (ref 134–144)
TRIGL SERPL-MCNC: 51 MG/DL (ref 0–149)
TSH SERPL DL<=0.005 MIU/L-ACNC: 0.82 UIU/ML (ref 0.45–4.5)
VIT B12 SERPL-MCNC: 367 PG/ML (ref 232–1245)
VLDLC SERPL CALC-MCNC: 9 MG/DL (ref 5–40)
WBC # BLD AUTO: 5.1 X10E3/UL (ref 3.4–10.8)

## 2024-10-04 ENCOUNTER — OFFICE VISIT (OUTPATIENT)
Dept: FAMILY MEDICINE CLINIC | Facility: CLINIC | Age: 59
End: 2024-10-04
Payer: COMMERCIAL

## 2024-10-04 VITALS
DIASTOLIC BLOOD PRESSURE: 74 MMHG | OXYGEN SATURATION: 99 % | TEMPERATURE: 97.8 F | HEIGHT: 68 IN | RESPIRATION RATE: 16 BRPM | HEART RATE: 59 BPM | BODY MASS INDEX: 21.7 KG/M2 | WEIGHT: 143.2 LBS | SYSTOLIC BLOOD PRESSURE: 106 MMHG

## 2024-10-04 DIAGNOSIS — G89.29 CHRONIC UPPER BACK PAIN: ICD-10-CM

## 2024-10-04 DIAGNOSIS — Z00.00 ENCOUNTER FOR PREVENTIVE CARE: ICD-10-CM

## 2024-10-04 DIAGNOSIS — Z00.00 ANNUAL PHYSICAL EXAM: Primary | ICD-10-CM

## 2024-10-04 DIAGNOSIS — Z12.11 ENCOUNTER FOR COLORECTAL CANCER SCREENING: ICD-10-CM

## 2024-10-04 DIAGNOSIS — S69.92XD INJURY OF LEFT WRIST, SUBSEQUENT ENCOUNTER: ICD-10-CM

## 2024-10-04 DIAGNOSIS — M54.9 CHRONIC UPPER BACK PAIN: ICD-10-CM

## 2024-10-04 DIAGNOSIS — Z12.12 ENCOUNTER FOR COLORECTAL CANCER SCREENING: ICD-10-CM

## 2024-10-04 PROCEDURE — 99396 PREV VISIT EST AGE 40-64: CPT | Performed by: STUDENT IN AN ORGANIZED HEALTH CARE EDUCATION/TRAINING PROGRAM

## 2024-10-04 NOTE — PROGRESS NOTES
"Chief Complaint  Annual Exam and Abnormal Lab (06/08/2024.)    Subjective    History of Present Illness  The patient is a 59-year-old female who presents for a physical examination and follow-up on previous lab results.    She reports experiencing joint stiffness in her hands, fatigue, and dizziness. She recalls a fall years ago that resulted in torn ligaments, which still cause some swelling. This has affected her  strength, making it difficult to perform tasks such as opening jars.    She mentions a recent menstrual period after a four-year hiatus, which led her to consult her gynecologist. An ultrasound revealed an increase in her uterine wall thickness from 4 mm to 7 mm, prompting a scheduled D and C procedure. She has a mammogram scheduled for November 2024.    Her back pain, which extends from shoulder to shoulder, has improved but occasionally returns. She believes her vitamin D supplement may be helping with this.    Her dizziness persists, often causing her to lose balance and stumble. She also notes occasional swelling in her legs or ankles after work.    SOCIAL HISTORY  She does not smoke.    FAMILY HISTORY  She denies any family history of colon cancer.       Misti Mcguire presents to [facility]  Abnormal Lab        Objective   Vital Signs:  /74 (BP Location: Left arm, Patient Position: Sitting, Cuff Size: Adult)   Pulse 59   Temp 97.8 °F (36.6 °C) (Oral)   Resp 16   Ht 172.7 cm (68\")   Wt 65 kg (143 lb 3.2 oz)   SpO2 99%   BMI 21.77 kg/m²   Estimated body mass index is 21.77 kg/m² as calculated from the following:    Height as of this encounter: 172.7 cm (68\").    Weight as of this encounter: 65 kg (143 lb 3.2 oz).    BMI is within normal parameters. No other follow-up for BMI required.      Physical Exam  HENT:      Head: Normocephalic and atraumatic.      Mouth/Throat:      Mouth: Mucous membranes are moist.      Pharynx: Oropharynx is clear.   Eyes:     "  Extraocular Movements: Extraocular movements intact.      Conjunctiva/sclera: Conjunctivae normal.      Pupils: Pupils are equal, round, and reactive to light.   Cardiovascular:      Rate and Rhythm: Normal rate and regular rhythm.   Pulmonary:      Effort: Pulmonary effort is normal.      Breath sounds: Normal breath sounds.   Abdominal:      General: Bowel sounds are normal.      Palpations: Abdomen is soft.   Musculoskeletal:         General: Normal range of motion.      Cervical back: Neck supple.   Skin:     General: Skin is warm.      Capillary Refill: Capillary refill takes less than 2 seconds.   Neurological:      General: No focal deficit present.      Mental Status: She is alert and oriented to person, place, and time. Mental status is at baseline.   Psychiatric:         Mood and Affect: Mood normal.        Result Review :  The following data was reviewed by: Karey Keane MD on 10/04/2024:  CMP          6/28/2024    11:30   CMP   Glucose 82    BUN 11    Creatinine 0.79    Sodium 141    Potassium 4.4    Chloride 103    Calcium 10.2    Total Protein 6.9    Albumin 4.7    Globulin 2.2    Total Bilirubin 1.4    Alkaline Phosphatase 64    AST (SGOT) 25    ALT (SGPT) 18    BUN/Creatinine Ratio 14      CBC          6/28/2024    11:30   CBC   WBC 5.1    RBC 4.71    Hemoglobin 14.9    Hematocrit 42.8    MCV 91    MCH 31.6    MCHC 34.8    RDW 12.0    Platelets 339      Lipid Panel          6/28/2024    11:30   Lipid Panel   Total Cholesterol 227    Triglycerides 51    HDL Cholesterol 80    VLDL Cholesterol 9    LDL Cholesterol  138      TSH          6/28/2024    11:30   TSH   TSH 0.825                Assessment and Plan   Diagnoses and all orders for this visit:    1. Annual physical exam (Primary)    2. Injury of left wrist, subsequent encounter  -     Ambulatory Referral to Physical Therapy for Evaluation & Treatment    3. Encounter for colorectal cancer screening  -     Ambulatory Referral For Screening  Colonoscopy    4. Chronic upper back pain  -     Ambulatory Referral to Physical Therapy for Evaluation & Treatment    5. Encounter for preventive care        Assessment & Plan  1. Joint Stiffness.  Inflammatory markers are within normal limits, ruling out inflammatory arthritis. The joint stiffness is likely due to degenerative arthritis, possibly exacerbated by a past fall. Physical therapy is recommended to regain strength and function. Meloxicam was suggested for severe pain. If physical therapy is not effective, a referral to a hand surgeon for potential intra-articular steroid injection will be considered.    2. Vitamin D Deficiency.  She has started taking over-the-counter vitamin D supplements and has noticed an improvement. She is advised to continue the supplements for at least 6-8 months to fully assess the benefits.    3. Elevated Cholesterol.  Despite a healthy diet, her cholesterol levels are slightly high, possibly due to hereditary factors. She is advised to continue dietary changes, including avoiding deep-fried foods, reducing red meat consumption, and increasing fruits and vegetables. Repeat labs will be done in 6 months to monitor cholesterol levels.    4. Back Pain.  The upper back pain appears to be muscular. Physical therapy is recommended to address this issue alongside the therapy for her hand.    5. Dizziness and Balance Issues.  She experiences occasional dizziness and balance issues, which seem to be more related to balance rather than true dizziness. She is advised to be mindful of these episodes and observe any patterns or triggers. This issue will be addressed further in the next visit.    6. Health Maintenance.  - A colonoscopy is recommended due to her history and current symptoms of fatigue and tiredness. She had a colonoscopy in 2006 and is due for another one.  - A Pap smear was attempted but was inconclusive; an ultrasound showed a thickened uterine wall, and a D&C is scheduled  to investigate further.  - A mammogram is scheduled for November 2024.  - Vaccinations for tetanus, influenza, Shingrix, and COVID-19 were offered but declined.    Follow-up      Return in 6 months for repeat labs and further evaluation.   Patient encouraged to partake of healthy diet rich in fresh fruits and vegetables as well as lean proteins.  Patient encouraged to participate in daily exercise with goal of 30 min sustained activity.  Wear seatbelt when driving  Flu shot annually           Follow Up   No follow-ups on file.  Patient was given instructions and counseling regarding her condition or for health maintenance advice. Please see specific information pulled into the AVS if appropriate.     Patient or patient representative verbalized consent for the use of Ambient Listening during the visit with  Karey Keane MD for chart documentation. 10/4/2024  10:31 EDT

## 2025-04-04 DIAGNOSIS — R79.89 LOW VITAMIN D LEVEL: ICD-10-CM

## 2025-04-04 DIAGNOSIS — R53.83 OTHER FATIGUE: Primary | ICD-10-CM

## 2025-04-04 DIAGNOSIS — E78.00 ELEVATED LDL CHOLESTEROL LEVEL: ICD-10-CM

## 2025-04-04 LAB
25(OH)D3+25(OH)D2 SERPL-MCNC: 57.3 NG/ML (ref 30–100)
ALBUMIN SERPL-MCNC: 4.3 G/DL (ref 3.5–5.2)
ALBUMIN/GLOB SERPL: 1.9 G/DL
ALP SERPL-CCNC: 60 U/L (ref 39–117)
ALT SERPL-CCNC: 16 U/L (ref 1–33)
AST SERPL-CCNC: 22 U/L (ref 1–32)
BILIRUB SERPL-MCNC: 1.2 MG/DL (ref 0–1.2)
BUN SERPL-MCNC: 14 MG/DL (ref 6–20)
BUN/CREAT SERPL: 18.9 (ref 7–25)
CALCIUM SERPL-MCNC: 9.8 MG/DL (ref 8.6–10.5)
CHLORIDE SERPL-SCNC: 105 MMOL/L (ref 98–107)
CHOLEST SERPL-MCNC: 220 MG/DL (ref 0–200)
CO2 SERPL-SCNC: 29 MMOL/L (ref 22–29)
CREAT SERPL-MCNC: 0.74 MG/DL (ref 0.57–1)
EGFRCR SERPLBLD CKD-EPI 2021: 94.5 ML/MIN/1.73
GLOBULIN SER CALC-MCNC: 2.3 GM/DL
GLUCOSE SERPL-MCNC: 84 MG/DL (ref 65–99)
HDLC SERPL-MCNC: 72 MG/DL (ref 40–60)
LDLC SERPL CALC-MCNC: 140 MG/DL (ref 0–100)
LDLC/HDLC SERPL: 1.93 {RATIO}
POTASSIUM SERPL-SCNC: 4.3 MMOL/L (ref 3.5–5.2)
PROT SERPL-MCNC: 6.6 G/DL (ref 6–8.5)
SODIUM SERPL-SCNC: 142 MMOL/L (ref 136–145)
TRIGL SERPL-MCNC: 44 MG/DL (ref 0–150)
TSH SERPL DL<=0.005 MIU/L-ACNC: 0.69 UIU/ML (ref 0.27–4.2)
VLDLC SERPL CALC-MCNC: 8 MG/DL (ref 5–40)

## 2025-04-10 ENCOUNTER — OFFICE VISIT (OUTPATIENT)
Dept: FAMILY MEDICINE CLINIC | Facility: CLINIC | Age: 60
End: 2025-04-10
Payer: COMMERCIAL

## 2025-04-10 VITALS
SYSTOLIC BLOOD PRESSURE: 98 MMHG | BODY MASS INDEX: 22.43 KG/M2 | DIASTOLIC BLOOD PRESSURE: 76 MMHG | HEIGHT: 68 IN | HEART RATE: 67 BPM | WEIGHT: 148 LBS | TEMPERATURE: 98.1 F | OXYGEN SATURATION: 99 % | RESPIRATION RATE: 16 BRPM

## 2025-04-10 DIAGNOSIS — G89.29 CHRONIC PAIN OF MULTIPLE JOINTS: ICD-10-CM

## 2025-04-10 DIAGNOSIS — M79.644 CHRONIC THUMB PAIN, BILATERAL: ICD-10-CM

## 2025-04-10 DIAGNOSIS — Z12.12 ENCOUNTER FOR COLORECTAL CANCER SCREENING: ICD-10-CM

## 2025-04-10 DIAGNOSIS — Z12.11 ENCOUNTER FOR COLORECTAL CANCER SCREENING: ICD-10-CM

## 2025-04-10 DIAGNOSIS — Z82.61 FAMILY HISTORY OF RHEUMATOID ARTHRITIS: ICD-10-CM

## 2025-04-10 DIAGNOSIS — M25.50 CHRONIC PAIN OF MULTIPLE JOINTS: ICD-10-CM

## 2025-04-10 DIAGNOSIS — L30.9 ECZEMA, UNSPECIFIED TYPE: Primary | ICD-10-CM

## 2025-04-10 DIAGNOSIS — E78.5 HYPERLIPIDEMIA, UNSPECIFIED HYPERLIPIDEMIA TYPE: ICD-10-CM

## 2025-04-10 DIAGNOSIS — Z00.00 ANNUAL PHYSICAL EXAM: ICD-10-CM

## 2025-04-10 DIAGNOSIS — M79.645 CHRONIC THUMB PAIN, BILATERAL: ICD-10-CM

## 2025-04-10 DIAGNOSIS — B36.9 FUNGAL RASH OF TORSO: ICD-10-CM

## 2025-04-10 DIAGNOSIS — G89.29 CHRONIC THUMB PAIN, BILATERAL: ICD-10-CM

## 2025-04-10 RX ORDER — TRIAMCINOLONE ACETONIDE 1 MG/G
1 OINTMENT TOPICAL 2 TIMES DAILY
Qty: 80 G | Refills: 0 | Status: SHIPPED | OUTPATIENT
Start: 2025-04-10

## 2025-04-10 RX ORDER — CLOTRIMAZOLE 1 %
1 CREAM (GRAM) TOPICAL 2 TIMES DAILY
Qty: 60 G | Refills: 0 | Status: SHIPPED | OUTPATIENT
Start: 2025-04-10

## 2025-04-10 NOTE — PROGRESS NOTES
Chief Complaint  Hyperlipidemia and Abnormal Lab (Drawn on 04/04/2025.)    Subjective    History of Present Illness  The patient is a 59-year-old female who presents for follow-up.    She reports the presence of a rash on her palm, which is not associated with itching. Additionally, she has an itchy rash that occasionally spreads. She has been using hydrocortisone cream as a treatment measure.    She has not sought physical therapy for her thumb, perceiving it as unhelpful. Despite persistent swelling, she retains full mobility in her thumb without pain. However, she experiences discomfort when grasping objects. This condition originated from a fall a few years ago, after which the swelling has remained constant. A few weeks ago, she woke up with her hand clenched into a fist, resulting in severe pain upon opening her hand. She suspects a form of arthritis, given her family history of rheumatoid arthritis in her father. She also reports occasional foot cramps, where her toes curl upwards. She expresses concern about potential deformities in her hands and feet, similar to those experienced by her father. She has been employed in the restaurant industry for 30 years, performing various tasks with both hands. Currently, she works in an office setting, engaging in minimal typing.    She is considering Cologuard as an alternative to colonoscopy. She maintains a healthy diet, avoiding fried foods. She is contemplating genetic testing for breast cancer, given her mother's diagnosis and her sister's positive test result.    Supplemental Information  She had a D and C and was put on progesterone from October to February because the cells in her uterus were supplying. She had an appointment with her OB/GYN in February and everything looked good, so she does not have to take the progesterone anymore.    FAMILY HISTORY  Her father had rheumatoid arthritis.  Her mother had breast cancer.  No family history of colon  "cancer.    MEDICATIONS  Current: Hydrocortisone cream.       Misti Mcguire presents to [facility]  History of Present Illness    Objective   Vital Signs:  BP 98/76 (BP Location: Left arm, Patient Position: Sitting, Cuff Size: Adult)   Pulse 67   Temp 98.1 °F (36.7 °C) (Oral)   Resp 16   Ht 172.7 cm (68\")   Wt 67.1 kg (148 lb)   SpO2 99%   BMI 22.50 kg/m²   Estimated body mass index is 22.5 kg/m² as calculated from the following:    Height as of this encounter: 172.7 cm (68\").    Weight as of this encounter: 67.1 kg (148 lb).    BMI is within normal parameters. No other follow-up for BMI required.      Physical Exam  HENT:      Head: Normocephalic and atraumatic.      Mouth/Throat:      Mouth: Mucous membranes are moist.      Pharynx: Oropharynx is clear.   Eyes:      Extraocular Movements: Extraocular movements intact.      Conjunctiva/sclera: Conjunctivae normal.      Pupils: Pupils are equal, round, and reactive to light.   Cardiovascular:      Rate and Rhythm: Normal rate and regular rhythm.   Pulmonary:      Effort: Pulmonary effort is normal.      Breath sounds: Normal breath sounds.   Abdominal:      General: Bowel sounds are normal.      Palpations: Abdomen is soft.   Musculoskeletal:         General: Normal range of motion.      Cervical back: Neck supple.   Skin:     General: Skin is warm.      Capillary Refill: Capillary refill takes less than 2 seconds.   Neurological:      General: No focal deficit present.      Mental Status: She is alert and oriented to person, place, and time. Mental status is at baseline.   Psychiatric:         Mood and Affect: Mood normal.        Result Review :  The following data was reviewed by: Karey Keane MD on 04/10/2025:  CMP          6/28/2024    11:30 4/4/2025    09:30   CMP   Glucose 82  84    BUN 11  14    Creatinine 0.79  0.74    EGFR 87  94.5    Sodium 141  142    Potassium 4.4  4.3    Chloride 103  105    Calcium 10.2  9.8  "   Total Protein 6.9  6.6    Albumin 4.7  4.3    Globulin 2.2  2.3    Total Bilirubin 1.4  1.2    Alkaline Phosphatase 64  60    AST (SGOT) 25  22    ALT (SGPT) 18  16    Albumin/Globulin Ratio  1.9    BUN/Creatinine Ratio 14  18.9      CBC          6/28/2024    11:30   CBC   WBC 5.1    RBC 4.71    Hemoglobin 14.9    Hematocrit 42.8    MCV 91    MCH 31.6    MCHC 34.8    RDW 12.0    Platelets 339      Lipid Panel          6/28/2024    11:30 4/4/2025    09:30   Lipid Panel   Total Cholesterol 227  220    Triglycerides 51  44    HDL Cholesterol 80  72    VLDL Cholesterol 9  8    LDL Cholesterol  138  140    LDL/HDL Ratio  1.93      TSH          6/28/2024    11:30 4/4/2025    09:30   TSH   TSH 0.825  0.690                Assessment and Plan   Diagnoses and all orders for this visit:    1. Eczema, unspecified type (Primary)  -     triamcinolone (KENALOG) 0.1 % ointment; Apply 1 Application topically to the appropriate area as directed 2 (Two) Times a Day.  Dispense: 80 g; Refill: 0    2. Fungal rash of torso  -     clotrimazole (LOTRIMIN) 1 % cream; Apply 1 Application topically to the appropriate area as directed 2 (Two) Times a Day.  Dispense: 60 g; Refill: 0    3. Encounter for colorectal cancer screening  -     Cologuard - Stool, Per Rectum; Future    4. Chronic thumb pain, bilateral  -     Ambulatory Referral to Hand Surgery    5. Family history of rheumatoid arthritis  -     Sedimentation rate, automated  -     C-reactive protein  -     STEPHIE  -     Rheumatoid Factor  -     Cyclic Citrul Peptide Antibody, IgG / IgA    6. Chronic pain of multiple joints  -     Sedimentation rate, automated  -     C-reactive protein  -     STEPHIE  -     Rheumatoid Factor  -     Cyclic Citrul Peptide Antibody, IgG / IgA        Assessment & Plan  1. Rash.  The rash on her palm is likely due to frequent hand washing leading to dryness and dermatitis. The rash under her breast appears to be fungal, possibly exacerbated by friction from her bra  and sweat accumulation. She was advised to consider using padded bras without wires to reduce friction. A prescription for triamcinolone cream was provided for use twice daily on any rash below the neck for up to 2 weeks. If the rash persists, a break from the cream is recommended. Additionally, clotrimazole cream was prescribed for the fungal rash. She was instructed to continue using hydrocortisone cream for mild rashes.    2. Suspected arthritis.  Given her family history of rheumatoid arthritis and her symptoms, there is a concern for inflammatory arthritis. Previous CRP and ESR tests were normal, suggesting degenerative arthritis as a more likely cause of her joint stiffness. Physical therapy was recommended, and she was previously prescribed meloxicam. A referral to a hand surgeon for potential intra-articular steroid injection will be considered. A comprehensive rheumatoid workup will be ordered, including CRP, ESR, RA, CCP, and STEPHIE tests. A referral to a hand specialist will be made for further evaluation. If the lab results are positive, a referral to rheumatology will be arranged.    3. Health maintenance.  Her cholesterol levels have remained stable, placing her at mild risk according to the 10-year heart score. She was advised to replace red meat with chicken or fish, limit egg yolk consumption to one per day, and opt for 2% or less dairy products. A Cologuard kit will be sent to her home for colon cancer screening. She was encouraged to return the kit promptly. She was also advised to consult a high-risk breast screening doctor due to her family history of breast cancer.     The 10-year ASCVD risk score (Ryan CARTY, et al., 2019) is: 1.3%    Values used to calculate the score:      Age: 59 years      Sex: Female      Is Non- : No      Diabetic: No      Tobacco smoker: No      Systolic Blood Pressure: 98 mmHg      Is BP treated: No      HDL Cholesterol: 72 mg/dL      Total  Cholesterol: 220 mg/dL         Follow Up   No follow-ups on file.  Patient was given instructions and counseling regarding her condition or for health maintenance advice. Please see specific information pulled into the AVS if appropriate.     Patient or patient representative verbalized consent for the use of Ambient Listening during the visit with  Karey Keane MD for chart documentation. 4/17/2025  09:38 EDT

## 2025-04-11 LAB
ANA SER QL: NEGATIVE
CCP IGA+IGG SERPL IA-ACNC: 4 UNITS (ref 0–19)
CRP SERPL-MCNC: <0.3 MG/DL (ref 0–0.5)
ERYTHROCYTE [SEDIMENTATION RATE] IN BLOOD BY WESTERGREN METHOD: <1 MM/HR (ref 0–30)
RHEUMATOID FACT SERPL-ACNC: <10 IU/ML